# Patient Record
Sex: FEMALE | Race: WHITE | NOT HISPANIC OR LATINO | URBAN - METROPOLITAN AREA
[De-identification: names, ages, dates, MRNs, and addresses within clinical notes are randomized per-mention and may not be internally consistent; named-entity substitution may affect disease eponyms.]

---

## 2022-05-20 PROCEDURE — 87086 URINE CULTURE/COLONY COUNT: CPT | Performed by: NURSE PRACTITIONER

## 2022-05-21 ENCOUNTER — TELEPHONE (OUTPATIENT)
Dept: URGENT CARE | Facility: CLINIC | Age: 22
End: 2022-05-21

## 2022-05-21 NOTE — TELEPHONE ENCOUNTER
----- Message from ERICA Miller sent at 5/21/2022  7:44 PM EDT -----  Please call the patient regarding her negative result. Urine culture shows normal carlos eduardo which is not indicative of infection. If symptoms persist f/u with primary care provider.